# Patient Record
(demographics unavailable — no encounter records)

---

## 2025-07-16 NOTE — PHYSICAL EXAM
[Midline] : trachea located in midline position [de-identified] : Right cerumen.  Left WNL [de-identified] : Right ossicular reconstruction in good position without extrusion in the right ear.  No Cholesteatoma noted.  Left WNL [Nasal Endoscopy Performed] : nasal endoscopy was performed, see procedure section for findings [de-identified] : congested [Normal] : no rashes

## 2025-07-16 NOTE — HISTORY OF PRESENT ILLNESS
[de-identified] : 21 y/o F with Hx of Right Cholesteatoma removal with OCR and Tympanoplasty.   She notes no pain, no change in hearing, no d/c, no infections, no vertigo.  No nasal or throat c/o. Does note this allergy season has been having intermittent b/l ear pressure, also with throat itching and mild cough.  Was taking Zyrtec and changed to Allegra.  Also using Flonase x 2 weeks and Azelastine.  2 Days ago was started on a 5 day course of 20 mg prednisone.  Notes no nasal congestion.  Feels ear symptoms are improving.

## 2025-07-16 NOTE — END OF VISIT
[FreeTextEntry3] : I personally saw and examined LIANET DUTTA in detail.I have made changes in changes in the body of the note where appropriate. I personally reviewed the HPI, PMH, FH, SH, ROS and medications/allergies. I have spoken to DARYL Garrison regarding the history and have personally determined the assessment and plan of care and documented this myself.. I performed all procedures and performed the physical exam. I have made changes in the body of the note where appropriate.   Attesting Faculty: See Attending Signature Below

## 2025-07-16 NOTE — DATA REVIEWED
[de-identified] : Hearing Test performed to evaluate the extent of hearing loss and  to explain pt's symptoms  was personally reviewed and revealed Tymps-wnl Hearing- stable CHL -right ear

## 2025-07-16 NOTE — REASON FOR VISIT
[Subsequent Evaluation] : a subsequent evaluation for [FreeTextEntry2] : F/U Right Tympanoplasty with OCR in 2012

## 2025-07-16 NOTE — ASSESSMENT
[FreeTextEntry1] : Right OCR with tympanoplasty and excision of Cholesteatoma in 2012: - Cerumen removed from Right.  - Audiogram today-stable CHL-AD - Continue with annual evaluation   Allergic rhinitis with ETD b/l: - Continue with Allegra, Flonase, and Azelastine - Complete current course of Prednisone - F/U with Allergist as her allergy symptoms have been increasing each year for the past few years.

## 2025-07-16 NOTE — PROCEDURE
[FreeTextEntry3] : Procedure - Cerumen Removal. Indication - Obstructing visualization of TM After informed verbal consent is obtained, cerumen was removed from the right ear canal(s) with a curette.  Normal appearing canal(s) following removal.  [de-identified] : Ear pressure / ETD [FreeTextEntry6] : Nasal Endoscopy: Reason for nasal endoscopy: anterior rhinoscopy insufficient to account for symptoms.   Flexible scope #2 was used. Right nasal passage with hypertrphy of inferior, middle and superior turbinates. Nasal passage patent with clear widely patent max antrostomy. Polypoid mucosa diffusely and minimal crusting noted posteriorly. Clear sphenoethmoid recess. Left nasal passage with hypertrophy of inferior, middle and superior turbinates. Nasal passage was patent with clear middle meatus and sphenoethmoid recess. No mucopurulence appreciated. Nasopharynx clear.